# Patient Record
Sex: MALE | Race: WHITE | NOT HISPANIC OR LATINO | Employment: FULL TIME | URBAN - METROPOLITAN AREA
[De-identification: names, ages, dates, MRNs, and addresses within clinical notes are randomized per-mention and may not be internally consistent; named-entity substitution may affect disease eponyms.]

---

## 2019-01-04 ENCOUNTER — OFFICE VISIT (OUTPATIENT)
Dept: FAMILY MEDICINE CLINIC | Facility: CLINIC | Age: 39
End: 2019-01-04

## 2019-01-04 VITALS
HEART RATE: 97 BPM | OXYGEN SATURATION: 98 % | DIASTOLIC BLOOD PRESSURE: 78 MMHG | BODY MASS INDEX: 29.52 KG/M2 | SYSTOLIC BLOOD PRESSURE: 130 MMHG | TEMPERATURE: 98.1 F | HEIGHT: 74 IN | WEIGHT: 230 LBS | RESPIRATION RATE: 18 BRPM

## 2019-01-04 DIAGNOSIS — Z13.220 ENCOUNTER FOR LIPID SCREENING FOR CARDIOVASCULAR DISEASE: ICD-10-CM

## 2019-01-04 DIAGNOSIS — Z00.00 WELL ADULT EXAM: Primary | ICD-10-CM

## 2019-01-04 DIAGNOSIS — Z13.6 ENCOUNTER FOR LIPID SCREENING FOR CARDIOVASCULAR DISEASE: ICD-10-CM

## 2019-01-04 DIAGNOSIS — L98.9 SKIN LESION OF LEFT LEG: ICD-10-CM

## 2019-01-04 PROCEDURE — 99395 PREV VISIT EST AGE 18-39: CPT | Performed by: FAMILY MEDICINE

## 2019-01-04 NOTE — PROGRESS NOTES
Assessment/Plan       Problems Addressed this Visit     None      Visit Diagnoses     Well adult exam    -  Primary    Relevant Orders    Lipid Panel With / Chol / HDL Ratio    Comprehensive Metabolic Panel    Encounter for lipid screening for cardiovascular disease        Relevant Orders    Lipid Panel With / Chol / HDL Ratio    Comprehensive Metabolic Panel    Skin lesion of left leg                Follow up: Return if symptoms worsen or fail to improve, for follow up depends on review of labs and testing.     DISCUSSION  Well examination.  Continue routine health maintenance including regular dentistry, recommend eye exam, seatbelt use, exercise, diet changes and losing weight.    Check labs as noted.  He will follow-up and do this fasting.    Skin lesion of left leg.  Appears to be vascular.  May try some hydrocortisone to see if that will help the irritation but if not improving, may need to see dermatology.  Since it has not changed in the last 3-5 years, doubt that it is a malignancy and      MEDICATIONS PRESCRIBED  Requested Prescriptions      No prescriptions requested or ordered in this encounter            -------------------------------------------    Subjective     Chief Complaint   Patient presents with   • Annual Exam         History of Present Illness    The patient is a 38 y.o. male who comes in to the office today for well exam.      Nutrition:  Not really.   Exercise:  Not enough  Sleep:  sleep ok    + seat belt     Dentist: + regular  Eye Exam: no exam.     Stressors: Work stress, new position. Wife deployed to [place] [facility]. 4 children. ( 14,12,9 and 6)    Advanced Directives:  yes        FH : DM 2, GF + cancer, no FH colon cancer    Colonoscopy: not yet.     Other concerns/problems:    Spot on the left leg. A few years ago, appeared. 4-5 years  Looked liked a scar but no recall of injury  Discoloration not getting better  Over last 1-2 months, shedding skin now. Was in shower and peel off and then  "would bleed. (one month ago this occurred)  No pain   No increase in size over 4-5 years             Social History     Tobacco Use   Smoking Status Never Smoker   Smokeless Tobacco Never Used        Past Medical History,Medications, Allergies, and social history was reviewed.      Review of Systems   Constitutional: Negative.    HENT: Negative.    Respiratory: Negative.    Cardiovascular: Negative.         Occ sternum pain but has not happened in awhile. Hard to breath when occurs   Gastrointestinal: Negative.    Genitourinary: Negative.  Negative for difficulty urinating.   Musculoskeletal: Negative.  Negative for arthralgias and back pain.        Feet occ pain. Left. When exercises, \"bone\" out of place and then pain. Sporadic. Sharp pain. Comes and goes.    Skin: Positive for skin lesions.   Neurological: Negative.  Negative for dizziness and syncope.   Psychiatric/Behavioral: Negative.  Negative for sleep disturbance and depressed mood.        Wife states patel       Objective     Vitals:    01/04/19 1427   BP: 130/78   Pulse: 97   Resp: 18   Temp: 98.1 °F (36.7 °C)   TempSrc: Temporal   SpO2: 98%   Weight: 104 kg (230 lb)   Height: 188 cm (74\")          Physical Exam   Constitutional: Vital signs are normal. He appears well-developed and well-nourished.   HENT:   Head: Normocephalic and atraumatic.   Right Ear: Hearing, tympanic membrane, external ear and ear canal normal.   Left Ear: Hearing, tympanic membrane, external ear and ear canal normal.   Mouth/Throat: Oropharynx is clear and moist.   Eyes: Conjunctivae, EOM and lids are normal. Pupils are equal, round, and reactive to light.   Neck: Normal range of motion. Neck supple. No thyromegaly present.   Cardiovascular: Normal rate, regular rhythm and normal heart sounds. Exam reveals no friction rub.   No murmur heard.  Pulmonary/Chest: Effort normal and breath sounds normal. No respiratory distress. He has no wheezes. He has no rales.   Abdominal: Soft. " Normal appearance and bowel sounds are normal. He exhibits no distension and no mass. There is no tenderness. There is no rebound and no guarding.   Musculoskeletal: He exhibits no edema.   Neurological: He is alert. He has normal strength.   Skin: Skin is warm and dry.   Psychiatric: He has a normal mood and affect. His speech is normal. Cognition and memory are normal.   Nursing note and vitals reviewed.    Left shin: Lesion 1 cm, no bleeding, blanches. Non tender                Red Mckeon MD

## 2019-01-07 ENCOUNTER — RESULTS ENCOUNTER (OUTPATIENT)
Dept: FAMILY MEDICINE CLINIC | Facility: CLINIC | Age: 39
End: 2019-01-07

## 2019-01-07 DIAGNOSIS — Z13.220 ENCOUNTER FOR LIPID SCREENING FOR CARDIOVASCULAR DISEASE: ICD-10-CM

## 2019-01-07 DIAGNOSIS — Z13.6 ENCOUNTER FOR LIPID SCREENING FOR CARDIOVASCULAR DISEASE: ICD-10-CM

## 2019-01-07 DIAGNOSIS — Z00.00 WELL ADULT EXAM: ICD-10-CM

## 2019-11-04 ENCOUNTER — NURSE TRIAGE (OUTPATIENT)
Dept: CALL CENTER | Facility: HOSPITAL | Age: 39
End: 2019-11-04

## 2019-11-04 ENCOUNTER — TELEPHONE (OUTPATIENT)
Dept: FAMILY MEDICINE CLINIC | Facility: CLINIC | Age: 39
End: 2019-11-04

## 2019-11-04 NOTE — TELEPHONE ENCOUNTER
Call received through [facility] that patient was on the line requesting to make a dr. appt for palpitations.      Reason for Disposition  • Problems with anxiety or stress    Additional Information  • Negative: Passed out (i.e., lost consciousness, collapsed and was not responding)  • Negative: Shock suspected (e.g., cold/pale/clammy skin, too weak to stand, low BP, rapid pulse)  • Negative: Difficult to awaken or acting confused (e.g., disoriented, slurred speech)  • Negative: Visible sweat on face or sweat dripping down face  • Negative: Unable to walk, or can only walk with assistance (e.g., requires support)  • Negative: [1] Received SHOCK from implantable cardiac defibrillator AND [2] persisting symptoms (i.e., palpitations, lightheadedness)  • Negative: Sounds like a life-threatening emergency to the triager  • Negative: Chest pain  • Negative: Difficulty breathing  • Negative: Dizziness, lightheadedness, or weakness  • Negative: [1] Heart beating very rapidly (e.g., > 140 / minute) AND [2] present now  (Exception: during exercise)  • Negative: Heart beating very slowly (e.g., < 50 / minute)  (Exception: athlete)  • Negative: New or worsened shortness of breath with activity (dyspnea on exertion)  • Negative: Patient sounds very sick or weak to the triager  • Negative: [1] Heart beating very rapidly (e.g., > 140 / minute) AND [2] not present now  (Exception: during exercise)  • Negative: [1] Skipped or extra beat(s) AND [2] increases with exercise or exertion  • Negative: [1] Skipped or extra beat(s) AND [2] occurs 4 or more times per minute  • Negative: New or worsened ankle swelling  • Negative: History of heart disease  (i.e., heart attack, bypass surgery, angina, angioplasty, CHF) (Exception: brief heart beat symptoms that went away and now feels well)  • Negative: Age > 60 years (Exception: brief heart beat symptoms that went away and now feels well)  • Negative: Taking water pill (i.e., diuretic) or heart  "medication (e.g., digoxin)  • Negative: Wearing a \"holter monitor\" or \"cardiac event monitor\"  • Negative: [1] Received SHOCK from implantable cardiac defibrillator AND [2] now feels well  • Negative: History of hyperthyroidism or taking thyroid medication  • Negative: Known or suspected substance abuse (e.g., cocaine, alcohol abuse)  • Negative: [1] Palpitations AND [2] no improvement after following Care Advice    Answer Assessment - Initial Assessment Questions  1. DESCRIPTION: \"Please describe your heart rate or heart beat that you are having\" (e.g., fast/slow, regular/irregular, skipped or extra beats, \"palpitations\")      Palpitations  2. ONSET: \"When did it start?\" (Minutes, hours or days)       2-3 months ago  3. DURATION: \"How long does it last\" (e.g., seconds, minutes, hours)      minutes  4. PATTERN \"Does it come and go, or has it been constant since it started?\"  \"Does it get worse with exertion?\"   \"Are you feeling it now?\"      intermittent  5. TAP: \"Using your hand, can you tap out what you are feeling on a chair or table in front of you, so that I can hear?\" (Note: not all patients can do this)        Described as skipping a few beats at a time  6. HEART RATE: \"Can you tell me your heart rate?\" \"How many beats in 15 seconds?\"  (Note: not all patients can do this)        Not happening at this time  7. RECURRENT SYMPTOM: \"Have you ever had this before?\" If so, ask: \"When was the last time?\" and \"What happened that time?\"       Few days ago  8. CAUSE: \"What do you think is causing the palpitations?\"      Stress  9. CARDIAC HISTORY: \"Do you have any history of heart disease?\" (e.g., heart attack, angina, bypass surgery, angioplasty, arrhythmia)       No  10. OTHER SYMPTOMS: \"Do you have any other symptoms?\" (e.g., dizziness, chest pain, sweating, difficulty breathing)        No  11. PREGNANCY: \"Is there any chance you are pregnant?\" \"When was your last menstrual period?\"        N/A    Protocols used: " HEART RATE AND HEARTBEAT QUESTIONS-ADULT-AH

## 2019-11-04 NOTE — TELEPHONE ENCOUNTER
Attempted to contact patient. No answer, left voicemail message to return call with office number given.

## 2019-11-04 NOTE — TELEPHONE ENCOUNTER
FYI  PATIENT CALLED REQUESTING AN APPT WITH DR. ELDER. PATIENT STATED HE HAD CHEST PAIN, TRIAGED TO [facility] AND CONFERENCED IN WITH JANET.     SHE TRIAGED PATIENT - PLEASE SEE TRIAGE NOTE CREATED BY JANET ON 11/04/2019     OKAY TO MAKE AN APPT PER JANET.     APPT MADE FOR 11/05/2019 @3: WITH FIRST AVAILABLE PROVIDER- SABRINA BRIGGS.    THANK YOU.

## 2019-11-04 NOTE — TELEPHONE ENCOUNTER
PATIENT CALLED BACK AND WILL KEEP APPT FOR TOMORROW. HAD CONFLICT FOR TODAYS WORK IN APPT WITH DR ELDER. PATIENT STATED NO CHEST PAINS CURRENTLY     PT CALL BACK  [phone]

## 2019-11-05 ENCOUNTER — OFFICE VISIT (OUTPATIENT)
Dept: FAMILY MEDICINE CLINIC | Facility: CLINIC | Age: 39
End: 2019-11-05

## 2019-11-05 VITALS
HEART RATE: 57 BPM | SYSTOLIC BLOOD PRESSURE: 134 MMHG | BODY MASS INDEX: 27.08 KG/M2 | OXYGEN SATURATION: 98 % | WEIGHT: 211 LBS | RESPIRATION RATE: 16 BRPM | DIASTOLIC BLOOD PRESSURE: 82 MMHG | HEIGHT: 74 IN | TEMPERATURE: 97.5 F

## 2019-11-05 DIAGNOSIS — F43.9 STRESS AT HOME: ICD-10-CM

## 2019-11-05 DIAGNOSIS — R00.2 HEART PALPITATIONS: Primary | ICD-10-CM

## 2019-11-05 PROCEDURE — 99214 OFFICE O/P EST MOD 30 MIN: CPT | Performed by: NURSE PRACTITIONER

## 2019-11-05 NOTE — PROGRESS NOTES
Subjective   Alfredo Muñoz is a 39 y.o. male.     History of Present Illness   Random palpitations, not painful but gets his attention started a few months ago, recently more frequently one time a week  Lasts a few seconds not dizziness or SOA  Cannot associate it with any activity,   Just sitting at home he will have symptoms of heart fluttering  No hx of heart problems  Lot of personal stress wife returned from deployment  Light alcohol intake  Increase in intake of Coffee 2 cups per day or more now, used to only have 1  No tobacco use     The following portions of the patient's history were reviewed and updated as appropriate: allergies, current medications, past family history, past medical history, past social history, past surgical history and problem list.    Review of Systems   Constitutional: Negative.  Negative for chills, fatigue and fever.   HENT: Negative.    Eyes: Negative.    Respiratory: Negative.    Cardiovascular: Positive for chest pain and palpitations. Negative for leg swelling.   Gastrointestinal: Negative.  Negative for nausea and vomiting.   Musculoskeletal: Negative.    Skin: Negative.    Allergic/Immunologic: Negative.    Neurological: Negative.    Hematological: Negative.    Psychiatric/Behavioral: Negative.  Positive for stress.       Objective   Physical Exam   Constitutional: He is oriented to person, place, and time. He appears well-developed and well-nourished. No distress.   HENT:   Head: Normocephalic and atraumatic.   Right Ear: External ear normal.   Left Ear: External ear normal.   Nose: Nose normal.   Mouth/Throat: Oropharynx is clear and moist. No oropharyngeal exudate.   Neck: Neck supple. No JVD present. No thyromegaly present.   Cardiovascular: Normal rate, regular rhythm, normal heart sounds and intact distal pulses. Exam reveals no gallop and no friction rub.   No murmur heard.  Pulmonary/Chest: Effort normal and breath sounds normal. No stridor. No respiratory distress.  He has no wheezes. He has no rales.   Abdominal: Soft. Bowel sounds are normal.   Musculoskeletal: Normal range of motion. He exhibits no edema.   Lymphadenopathy:     He has no cervical adenopathy.   Neurological: He is alert and oriented to person, place, and time. He displays normal reflexes.   Skin: Skin is warm and dry. Capillary refill takes less than 2 seconds. He is not diaphoretic.   Psychiatric: He has a normal mood and affect. His behavior is normal. Judgment and thought content normal.   Nursing note and vitals reviewed.      ECG 12 Lead  Date/Time: 11/7/2019 10:17 AM  Performed by: Elizabeth Tong APRN  Authorized by: Elizabeth Tong APRN   Rhythm: sinus rhythm  Rate: bradycardic  BPM: 58  Conduction: conduction normal  ST Segments: ST segments normal  T Waves: T waves normal  QRS axis: normal  Other: no other findings    Clinical impression: normal ECG          Assessment/Plan   Alfredo was seen today for chest pain.    Diagnoses and all orders for this visit:    Heart palpitations  -     TSH  -     Comprehensive Metabolic Panel  -     CBC & Differential  -     Vitamin B12  -     SCANNED EKG  -     ECG 12 Lead    Stress at home  -     Comprehensive Metabolic Panel  -     Vitamin B12    normal EKG  Normal labs   If symptoms persist or worsen recommend Holter monitor and or ECHO  Pt agrees

## 2019-11-06 LAB
ALBUMIN SERPL-MCNC: 4.7 G/DL (ref 3.5–5.5)
ALBUMIN/GLOB SERPL: 2.2 {RATIO} (ref 1.2–2.2)
ALP SERPL-CCNC: 52 IU/L (ref 39–117)
ALT SERPL-CCNC: 19 IU/L (ref 0–44)
AST SERPL-CCNC: 18 IU/L (ref 0–40)
BASOPHILS # BLD AUTO: 0 X10E3/UL (ref 0–0.2)
BASOPHILS NFR BLD AUTO: 1 %
BILIRUB SERPL-MCNC: 0.5 MG/DL (ref 0–1.2)
BUN SERPL-MCNC: 14 MG/DL (ref 6–24)
BUN/CREAT SERPL: 14 (ref 9–20)
CALCIUM SERPL-MCNC: 9.5 MG/DL (ref 8.7–10.2)
CHLORIDE SERPL-SCNC: 101 MMOL/L (ref 96–106)
CO2 SERPL-SCNC: 24 MMOL/L (ref 20–29)
CREAT SERPL-MCNC: 0.98 MG/DL (ref 0.76–1.27)
EOSINOPHIL # BLD AUTO: 0.3 X10E3/UL (ref 0–0.4)
EOSINOPHIL NFR BLD AUTO: 4 %
ERYTHROCYTE [DISTWIDTH] IN BLOOD BY AUTOMATED COUNT: 14 % (ref 12.3–15.4)
GLOBULIN SER CALC-MCNC: 2.1 G/DL (ref 1.5–4.5)
GLUCOSE SERPL-MCNC: 94 MG/DL (ref 65–99)
HCT VFR BLD AUTO: 44.9 % (ref 37.5–51)
HGB BLD-MCNC: 15.5 G/DL (ref 13–17.7)
IMM GRANULOCYTES # BLD AUTO: 0 X10E3/UL (ref 0–0.1)
IMM GRANULOCYTES NFR BLD AUTO: 0 %
LYMPHOCYTES # BLD AUTO: 1.3 X10E3/UL (ref 0.7–3.1)
LYMPHOCYTES NFR BLD AUTO: 22 %
MCH RBC QN AUTO: 30.3 PG (ref 26.6–33)
MCHC RBC AUTO-ENTMCNC: 34.5 G/DL (ref 31.5–35.7)
MCV RBC AUTO: 88 FL (ref 79–97)
MONOCYTES # BLD AUTO: 0.5 X10E3/UL (ref 0.1–0.9)
MONOCYTES NFR BLD AUTO: 8 %
NEUTROPHILS # BLD AUTO: 4.1 X10E3/UL (ref 1.4–7)
NEUTROPHILS NFR BLD AUTO: 65 %
PLATELET # BLD AUTO: 213 X10E3/UL (ref 150–450)
POTASSIUM SERPL-SCNC: 4.2 MMOL/L (ref 3.5–5.2)
PROT SERPL-MCNC: 6.8 G/DL (ref 6–8.5)
RBC # BLD AUTO: 5.11 X10E6/UL (ref 4.14–5.8)
SODIUM SERPL-SCNC: 141 MMOL/L (ref 134–144)
TSH SERPL DL<=0.005 MIU/L-ACNC: 1.15 UIU/ML (ref 0.45–4.5)
VIT B12 SERPL-MCNC: 333 PG/ML (ref 232–1245)
WBC # BLD AUTO: 6.2 X10E3/UL (ref 3.4–10.8)

## 2019-11-07 PROCEDURE — 93000 ELECTROCARDIOGRAM COMPLETE: CPT | Performed by: NURSE PRACTITIONER

## 2020-02-10 ENCOUNTER — OFFICE VISIT (OUTPATIENT)
Dept: FAMILY MEDICINE CLINIC | Facility: CLINIC | Age: 40
End: 2020-02-10

## 2020-02-10 VITALS
HEIGHT: 74 IN | OXYGEN SATURATION: 98 % | SYSTOLIC BLOOD PRESSURE: 112 MMHG | BODY MASS INDEX: 28.75 KG/M2 | HEART RATE: 60 BPM | WEIGHT: 224 LBS | DIASTOLIC BLOOD PRESSURE: 74 MMHG | TEMPERATURE: 97.9 F

## 2020-02-10 DIAGNOSIS — M25.521 RIGHT ELBOW PAIN: Primary | ICD-10-CM

## 2020-02-10 PROCEDURE — 99213 OFFICE O/P EST LOW 20 MIN: CPT | Performed by: PHYSICIAN ASSISTANT

## 2020-02-10 RX ORDER — PREDNISONE 10 MG/1
TABLET ORAL
Qty: 21 EACH | Refills: 0 | Status: SHIPPED | OUTPATIENT
Start: 2020-02-10 | End: 2021-10-15

## 2020-02-10 NOTE — PROGRESS NOTES
Subjective   Alfredo Muñoz is a 39 y.o. male.     History of Present Illness   Pt presents with CC of R elbow pain. Felt along bony prominence of olecranon. Pain started on 1/12. No known injury. Did attend an event with son where they were doing climbing activities the day before. No known injury or trauma to elbow. No issues with this elbow in the past. First day he noticed pain, could not flex elbow. Had to hold straight.   Slowly getting better. ROM intact but still aching. Wants to make sure nothing else is wrong.   No swelling, redness, or warmth. No hx of gout.   R hand dominant.   Pt has not tried anything for symptoms.   No numbness or tingling of fingers. No forearm tenderness.     The following portions of the patient's history were reviewed and updated as appropriate: allergies, current medications, past family history, past medical history, past social history, past surgical history and problem list.    Review of Systems   Constitutional: Negative.  Negative for chills, diaphoresis, fatigue and fever.   HENT: Negative.  Negative for congestion, ear discharge, ear pain, hearing loss, nosebleeds, postnasal drip, sinus pressure, sneezing and sore throat.    Eyes: Negative.    Respiratory: Negative.  Negative for cough, chest tightness, shortness of breath and wheezing.    Cardiovascular: Negative.  Negative for chest pain, palpitations and leg swelling.   Gastrointestinal: Negative for abdominal distention, abdominal pain, blood in stool, constipation, diarrhea, nausea and vomiting.   Genitourinary: Negative.  Negative for difficulty urinating, dysuria, flank pain, frequency, hematuria and urgency.   Musculoskeletal: Positive for arthralgias. Negative for back pain, gait problem, joint swelling, myalgias, neck pain and neck stiffness.   Skin: Negative.  Negative for color change, pallor, rash and wound.   Neurological: Negative for dizziness, syncope, weakness, light-headedness, numbness and headaches.  "      Objective    Blood pressure 112/74, pulse 60, temperature 97.9 °F (36.6 °C), height 188 cm (74\"), weight 102 kg (224 lb), SpO2 98 %.     Physical Exam   Constitutional: He is oriented to person, place, and time. He appears well-developed and well-nourished.   HENT:   Head: Normocephalic and atraumatic.   Right Ear: External ear normal.   Left Ear: External ear normal.   Nose: Nose normal.   Mouth/Throat: Oropharynx is clear and moist. No oropharyngeal exudate.   Eyes: Conjunctivae are normal.   Neck: Normal range of motion. Neck supple. No tracheal deviation present. No thyromegaly present.   Cardiovascular: Normal rate, regular rhythm, normal heart sounds and intact distal pulses. Exam reveals no gallop and no friction rub.   No murmur heard.  Pulmonary/Chest: Effort normal and breath sounds normal. No respiratory distress. He has no wheezes. He has no rales. He exhibits no tenderness.   Abdominal: Soft. Bowel sounds are normal. He exhibits no distension and no mass. There is no tenderness. There is no rebound and no guarding. No hernia.   Musculoskeletal: Normal range of motion. He exhibits no edema or deformity.        Right elbow: He exhibits normal range of motion, no swelling, no effusion, no deformity and no laceration. Tenderness found. Olecranon process tenderness noted. No radial head, no medial epicondyle and no lateral epicondyle tenderness noted.   Lymphadenopathy:     He has no cervical adenopathy.   Neurological: He is alert and oriented to person, place, and time.   Skin: Skin is warm and dry.   Psychiatric: He has a normal mood and affect. His behavior is normal. Judgment and thought content normal.   Nursing note and vitals reviewed.      Assessment/Plan   Alfredo was seen today for elbow pain.    Diagnoses and all orders for this visit:    Right elbow pain  -     predniSONE (DELTASONE) 10 MG (21) tablet pack; Use as directed on package  -     XR Elbow 2 View Right    trial of steroid for " inflammation. If no improvement, proceed with XR of elbow. Pt agrees.

## 2020-03-04 ENCOUNTER — HOSPITAL ENCOUNTER (OUTPATIENT)
Dept: GENERAL RADIOLOGY | Facility: HOSPITAL | Age: 40
Discharge: HOME | End: 2020-03-04
Admitting: PHYSICIAN ASSISTANT

## 2020-03-04 PROCEDURE — 73070 X-RAY EXAM OF ELBOW: CPT

## 2020-03-09 DIAGNOSIS — M25.521 RIGHT ELBOW PAIN: Primary | ICD-10-CM

## 2020-03-16 ENCOUNTER — TELEPHONE (OUTPATIENT)
Dept: FAMILY MEDICINE CLINIC | Facility: CLINIC | Age: 40
End: 2020-03-16

## 2021-10-15 ENCOUNTER — HOSPITAL ENCOUNTER (OUTPATIENT)
Dept: GENERAL RADIOLOGY | Facility: HOSPITAL | Age: 41
Discharge: HOME | End: 2021-10-15
Admitting: NURSE PRACTITIONER

## 2021-10-15 ENCOUNTER — OFFICE VISIT (OUTPATIENT)
Dept: FAMILY MEDICINE CLINIC | Facility: CLINIC | Age: 41
End: 2021-10-15

## 2021-10-15 VITALS
TEMPERATURE: 98.2 F | DIASTOLIC BLOOD PRESSURE: 80 MMHG | SYSTOLIC BLOOD PRESSURE: 130 MMHG | RESPIRATION RATE: 20 BRPM | OXYGEN SATURATION: 96 % | HEART RATE: 76 BPM | WEIGHT: 218.6 LBS | HEIGHT: 74 IN | BODY MASS INDEX: 28.06 KG/M2

## 2021-10-15 DIAGNOSIS — R07.81 RIB PAIN ON LEFT SIDE: ICD-10-CM

## 2021-10-15 DIAGNOSIS — M25.532 ACUTE WRIST PAIN, LEFT: Primary | ICD-10-CM

## 2021-10-15 DIAGNOSIS — S62.102A CLOSED FRACTURE OF LEFT WRIST, INITIAL ENCOUNTER: Primary | ICD-10-CM

## 2021-10-15 DIAGNOSIS — M25.532 ACUTE WRIST PAIN, LEFT: ICD-10-CM

## 2021-10-15 PROCEDURE — 71101 X-RAY EXAM UNILAT RIBS/CHEST: CPT

## 2021-10-15 PROCEDURE — 73110 X-RAY EXAM OF WRIST: CPT

## 2021-10-15 PROCEDURE — 99214 OFFICE O/P EST MOD 30 MIN: CPT | Performed by: NURSE PRACTITIONER

## 2021-10-15 RX ORDER — CEPHALEXIN 500 MG/1
500 CAPSULE ORAL 2 TIMES DAILY
COMMUNITY
Start: 2021-10-12 | End: 2022-09-19
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
## 2022-09-19 ENCOUNTER — OFFICE VISIT (OUTPATIENT)
Dept: FAMILY MEDICINE CLINIC | Facility: CLINIC | Age: 42
End: 2022-09-19

## 2022-09-19 VITALS
HEIGHT: 74 IN | OXYGEN SATURATION: 97 % | TEMPERATURE: 98.2 F | DIASTOLIC BLOOD PRESSURE: 65 MMHG | HEART RATE: 80 BPM | WEIGHT: 219 LBS | BODY MASS INDEX: 28.11 KG/M2 | SYSTOLIC BLOOD PRESSURE: 128 MMHG | RESPIRATION RATE: 16 BRPM

## 2022-09-19 DIAGNOSIS — Z13.6 ENCOUNTER FOR LIPID SCREENING FOR CARDIOVASCULAR DISEASE: ICD-10-CM

## 2022-09-19 DIAGNOSIS — Z82.0 FH: SLEEP APNEA: ICD-10-CM

## 2022-09-19 DIAGNOSIS — Z13.220 ENCOUNTER FOR LIPID SCREENING FOR CARDIOVASCULAR DISEASE: ICD-10-CM

## 2022-09-19 DIAGNOSIS — R40.0 HAS DAYTIME DROWSINESS: ICD-10-CM

## 2022-09-19 DIAGNOSIS — Z00.00 WELL ADULT EXAM: Primary | ICD-10-CM

## 2022-09-19 DIAGNOSIS — R06.83 SNORING: ICD-10-CM

## 2022-09-19 PROCEDURE — 99396 PREV VISIT EST AGE 40-64: CPT | Performed by: FAMILY MEDICINE

## 2022-09-19 NOTE — PROGRESS NOTES
"Chief Complaint  Annual Exam    Subjective          Alfredo Muñoz presents to [facility]  History of Present Illness    Sleep Apnea  The patient reports that friends have told him that he snores heavily. He reports that he is tired during the day. He reports that he does not take naps during the day, but he does drink more caffeine. He denies headaches in the morning. The patient notes that he experiences fatigue when he is driving.    Diabetes Mellitus type 2  The patient reports a family history of diabetes on both paternal and maternal sides.    Health Maintenance  The patient sees a dentist regularly. He has not had an eye exam within the last year. The patient does wear his seatbelt. He notes that he has been exercising and eating healthy. The patient denies having the COVID-19 vaccines and does not plan to receive them. He denies being sick with a cough or cold. He denies chest pain, stomach problems, trouble urinating or erection issues. The patient denies any dizziness, syncope or depression.    COVID-19  The patient reports that he acquired COVID-19 in 2021.    Review of Systems   Constitutional: Positive for fatigue.   HENT: Negative.    Eyes: Positive for visual disturbance (close vision).   Respiratory: Negative.    Cardiovascular: Negative.    Gastrointestinal: Negative.    Genitourinary: Negative.  Negative for difficulty urinating and erectile dysfunction.   Musculoskeletal: Negative.    Neurological: Negative.  Negative for dizziness and syncope.   Psychiatric/Behavioral: Negative.         Objective       Vital Signs:   /65   Pulse 80   Temp 98.2 °F (36.8 °C)   Resp 16   Ht 188 cm (74\")   Wt 99.3 kg (219 lb)   SpO2 97%   BMI 28.12 kg/m²     Physical Exam  Vitals and nursing note reviewed.   Constitutional:       General: He is not in acute distress.     Appearance: Normal appearance. He is well-developed. He is not ill-appearing.   HENT:      Head: " Normocephalic and atraumatic.      Right Ear: Hearing, tympanic membrane, ear canal and external ear normal.      Left Ear: Hearing, tympanic membrane, ear canal and external ear normal.      Nose: Nose normal. No congestion or rhinorrhea.      Mouth/Throat:      Mouth: Mucous membranes are moist.      Pharynx: No oropharyngeal exudate or posterior oropharyngeal erythema.   Eyes:      General: Lids are normal.      Conjunctiva/sclera: Conjunctivae normal.      Pupils: Pupils are equal, round, and reactive to light.   Neck:      Thyroid: No thyromegaly.   Cardiovascular:      Rate and Rhythm: Normal rate and regular rhythm.      Heart sounds: Normal heart sounds. No murmur heard.    No friction rub.   Pulmonary:      Effort: Pulmonary effort is normal. No respiratory distress.      Breath sounds: Normal breath sounds. No wheezing or rales.   Abdominal:      General: Bowel sounds are normal. There is no distension.      Palpations: Abdomen is soft. There is no mass.      Tenderness: There is no abdominal tenderness. There is no guarding or rebound.   Musculoskeletal:      Right lower leg: No edema.      Left lower leg: No edema.   Skin:     General: Skin is warm and dry.   Neurological:      General: No focal deficit present.      Mental Status: He is alert.   Psychiatric:         Mood and Affect: Mood normal.         Speech: Speech normal.         Behavior: Behavior normal.          Result Review :                     Assessment and Plan    Diagnoses and all orders for this visit:    1. Well adult exam (Primary)  -     CBC & Differential  -     Comprehensive Metabolic Panel  -     Lipid Panel With / Chol / HDL Ratio    2. Snoring  -     Home Sleep Study; Future    3. Has daytime drowsiness  -     Home Sleep Study; Future    4. FH: sleep apnea    5. Encounter for lipid screening for cardiovascular disease  -     Lipid Panel With / Chol / HDL Ratio          DISCUSSION    Here for well examination.  Continue routine  health maintenance including routine dentistry, routine eye exams, safety, seatbelt use, exercise and proper nutrition.    He has snoring with daytime drowsiness and family history of sleep apnea.  Set up a home sleep study.  Suspect that he may have obstructive sleep apnea.  He also has a family history of sleep apnea.    Lipid screening.  He will follow-up for blood work including CBC, CMP and lipid panel.    Discussed future testing including colonoscopy versus Cologuard at age 44.  He does not wish to have COVID vaccination at this time.  He reports tetanus vaccination is up-to-date.    Follow Up   Return for follow up depends on review of labs and testing.    Patient was given instructions and counseling regarding his condition or for health maintenance advice. Please see specific information pulled into the AVS if appropriate.       Red Mckeon MD     Transcribed from ambient dictation for Red Mckeon MD by LEXY VILLAGOMEZ.  09/19/22   11:48 EDT    Patient verbalized consent to the visit recording.  I have personally performed the services described in this document as transcribed by the above individual, and it is both accurate and complete.  Red Mckeon MD  9/19/2022  18:31 EDT

## 2022-10-17 ENCOUNTER — APPOINTMENT (OUTPATIENT)
Dept: SLEEP MEDICINE | Facility: HOSPITAL | Age: 42
End: 2022-10-17

## 2022-10-31 ENCOUNTER — HOSPITAL ENCOUNTER (OUTPATIENT)
Dept: SLEEP MEDICINE | Facility: HOSPITAL | Age: 42
Discharge: HOME | End: 2022-10-31
Admitting: FAMILY MEDICINE

## 2022-10-31 VITALS — HEIGHT: 74 IN | BODY MASS INDEX: 28.11 KG/M2 | WEIGHT: 219 LBS

## 2022-10-31 DIAGNOSIS — R06.83 SNORING: ICD-10-CM

## 2022-10-31 DIAGNOSIS — R40.0 HAS DAYTIME DROWSINESS: ICD-10-CM

## 2022-10-31 PROCEDURE — 95800 SLP STDY UNATTENDED: CPT | Performed by: INTERNAL MEDICINE

## 2022-10-31 PROCEDURE — 95800 SLP STDY UNATTENDED: CPT

## 2022-11-01 LAB
ALBUMIN SERPL-MCNC: 4.8 G/DL (ref 3.5–5.2)
ALBUMIN/GLOB SERPL: 2.7 G/DL
ALP SERPL-CCNC: 55 U/L (ref 39–117)
ALT SERPL-CCNC: 16 U/L (ref 1–41)
AST SERPL-CCNC: 17 U/L (ref 1–40)
BASOPHILS # BLD AUTO: 0.06 10*3/MM3 (ref 0–0.2)
BASOPHILS NFR BLD AUTO: 0.9 % (ref 0–1.5)
BILIRUB SERPL-MCNC: 0.6 MG/DL (ref 0–1.2)
BUN SERPL-MCNC: 14 MG/DL (ref 6–20)
BUN/CREAT SERPL: 12.8 (ref 7–25)
CALCIUM SERPL-MCNC: 9.5 MG/DL (ref 8.6–10.5)
CHLORIDE SERPL-SCNC: 103 MMOL/L (ref 98–107)
CHOLEST SERPL-MCNC: 227 MG/DL (ref 0–200)
CHOLEST/HDLC SERPL: 5.04 {RATIO}
CO2 SERPL-SCNC: 26 MMOL/L (ref 22–29)
CREAT SERPL-MCNC: 1.09 MG/DL (ref 0.76–1.27)
EGFRCR SERPLBLD CKD-EPI 2021: 86.4 ML/MIN/1.73
EOSINOPHIL # BLD AUTO: 0.13 10*3/MM3 (ref 0–0.4)
EOSINOPHIL NFR BLD AUTO: 2 % (ref 0.3–6.2)
ERYTHROCYTE [DISTWIDTH] IN BLOOD BY AUTOMATED COUNT: 13 % (ref 12.3–15.4)
GLOBULIN SER CALC-MCNC: 1.8 GM/DL
GLUCOSE SERPL-MCNC: 82 MG/DL (ref 65–99)
HCT VFR BLD AUTO: 45.7 % (ref 37.5–51)
HDLC SERPL-MCNC: 45 MG/DL (ref 40–60)
HGB BLD-MCNC: 15.6 G/DL (ref 13–17.7)
IMM GRANULOCYTES # BLD AUTO: 0.05 10*3/MM3 (ref 0–0.05)
IMM GRANULOCYTES NFR BLD AUTO: 0.8 % (ref 0–0.5)
LDLC SERPL CALC-MCNC: 139 MG/DL (ref 0–100)
LYMPHOCYTES # BLD AUTO: 0.92 10*3/MM3 (ref 0.7–3.1)
LYMPHOCYTES NFR BLD AUTO: 13.9 % (ref 19.6–45.3)
MCH RBC QN AUTO: 30.2 PG (ref 26.6–33)
MCHC RBC AUTO-ENTMCNC: 34.1 G/DL (ref 31.5–35.7)
MCV RBC AUTO: 88.6 FL (ref 79–97)
MONOCYTES # BLD AUTO: 0.56 10*3/MM3 (ref 0.1–0.9)
MONOCYTES NFR BLD AUTO: 8.4 % (ref 5–12)
NEUTROPHILS # BLD AUTO: 4.92 10*3/MM3 (ref 1.7–7)
NEUTROPHILS NFR BLD AUTO: 74 % (ref 42.7–76)
NRBC BLD AUTO-RTO: 0.2 /100 WBC (ref 0–0.2)
PLATELET # BLD AUTO: 195 10*3/MM3 (ref 140–450)
POTASSIUM SERPL-SCNC: 4.2 MMOL/L (ref 3.5–5.2)
PROT SERPL-MCNC: 6.6 G/DL (ref 6–8.5)
RBC # BLD AUTO: 5.16 10*6/MM3 (ref 4.14–5.8)
SODIUM SERPL-SCNC: 142 MMOL/L (ref 136–145)
TRIGL SERPL-MCNC: 239 MG/DL (ref 0–150)
VLDLC SERPL CALC-MCNC: 43 MG/DL (ref 5–40)
WBC # BLD AUTO: 6.64 10*3/MM3 (ref 3.4–10.8)

## 2022-11-02 DIAGNOSIS — E78.2 MIXED HYPERLIPIDEMIA: Primary | ICD-10-CM

## 2024-03-08 ENCOUNTER — OFFICE VISIT (OUTPATIENT)
Dept: FAMILY MEDICINE CLINIC | Facility: CLINIC | Age: 44
End: 2024-03-08
Payer: COMMERCIAL

## 2024-03-08 VITALS
HEIGHT: 74 IN | SYSTOLIC BLOOD PRESSURE: 120 MMHG | HEART RATE: 51 BPM | WEIGHT: 217 LBS | TEMPERATURE: 97.1 F | DIASTOLIC BLOOD PRESSURE: 68 MMHG | OXYGEN SATURATION: 97 % | BODY MASS INDEX: 27.85 KG/M2 | RESPIRATION RATE: 14 BRPM

## 2024-03-08 DIAGNOSIS — B37.2 CANDIDIASIS OF SKIN: Primary | ICD-10-CM

## 2024-03-08 PROCEDURE — 99213 OFFICE O/P EST LOW 20 MIN: CPT | Performed by: NURSE PRACTITIONER

## 2024-03-08 RX ORDER — FLUCONAZOLE 150 MG/1
TABLET ORAL
Qty: 2 TABLET | Refills: 0 | Status: SHIPPED | OUTPATIENT
Start: 2024-03-08

## 2024-03-08 RX ORDER — CLOTRIMAZOLE AND BETAMETHASONE DIPROPIONATE 10; .64 MG/G; MG/G
1 CREAM TOPICAL 2 TIMES DAILY
Qty: 45 G | Refills: 2 | Status: SHIPPED | OUTPATIENT
Start: 2024-03-08

## 2024-12-13 ENCOUNTER — OFFICE VISIT (OUTPATIENT)
Dept: FAMILY MEDICINE CLINIC | Facility: CLINIC | Age: 44
End: 2024-12-13
Payer: COMMERCIAL

## 2024-12-13 VITALS
HEART RATE: 61 BPM | WEIGHT: 215.6 LBS | OXYGEN SATURATION: 97 % | DIASTOLIC BLOOD PRESSURE: 84 MMHG | SYSTOLIC BLOOD PRESSURE: 120 MMHG | RESPIRATION RATE: 14 BRPM | HEIGHT: 74 IN | TEMPERATURE: 97.1 F | BODY MASS INDEX: 27.67 KG/M2

## 2024-12-13 DIAGNOSIS — Z00.00 WELL ADULT EXAM: Primary | ICD-10-CM

## 2024-12-13 DIAGNOSIS — Z13.6 ENCOUNTER FOR LIPID SCREENING FOR CARDIOVASCULAR DISEASE: ICD-10-CM

## 2024-12-13 DIAGNOSIS — Z12.11 COLON CANCER SCREENING: ICD-10-CM

## 2024-12-13 DIAGNOSIS — Z13.220 ENCOUNTER FOR LIPID SCREENING FOR CARDIOVASCULAR DISEASE: ICD-10-CM

## 2024-12-13 PROCEDURE — 99396 PREV VISIT EST AGE 40-64: CPT | Performed by: FAMILY MEDICINE

## 2024-12-13 NOTE — PROGRESS NOTES
Chief Complaint  Annual Exam    Subjective          Alfredo Muñoz presents to [facility]    HPI         The patient is a 44-year-old male who is here for a well examination.    He reports no current health issues. 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 Since reducing his caffeine consumption, he has not had any further episodes. He maintains an active lifestyle, with increased physical activity over the past year, including running in preparation for an upcoming marathon in April. He reports no exercise-induced complications. His diet is generally healthy, avoiding fried and processed foods. He has consumed food today, including blueberries, lunch meat, and tomato soup. He has not received influenza or COVID-19 vaccines and is uncertain about his tetanus vaccination status. He has never had a blood transfusion, used intravenous drugs, or had multiple sexual partners. His energy levels are satisfactory. He has not had any recent illnesses such as colds or coughs. His vision is beginning to decline, but he does not have an ophthalmologist. He regularly sees a dentist. He reports no gastrointestinal issues, urinary difficulties, or skin concerns. He experienced a yeast infection in his axilla, which has since resolved. He experiences headaches when he reduces his caffeine intake. He reports no mood disturbances, depression, anxiety, or hearing problems. His parents are alive, with his father and two sisters having diabetes. His mother is in good health.    FAMILY HISTORY  - Grandfather on mother's side had cancer  - Father has diabetes  - Two sisters have diabetes    IMMUNIZATIONS  - Due for influenza vaccine  - Due for COVID-19 vaccine  - Due for tetanus vaccine       OTHER NOTES:          Review of Systems   Constitutional: Negative.  Negative for fatigue.   HENT: Negative.   "Negative for congestion and hearing loss.    Eyes: Negative.    Respiratory:  Negative for cough and shortness of breath.    Cardiovascular: Negative.    Gastrointestinal: Negative.  Negative for blood in stool.   Genitourinary: Negative.  Negative for difficulty urinating.   Musculoskeletal: Negative.    Skin: Negative.    Neurological:  Negative for dizziness, syncope and headache.   Psychiatric/Behavioral: Negative.  Negative for depressed mood. The patient is not nervous/anxious.         Objective       Vital Signs:   /84   Pulse 61   Temp 97.1 °F (36.2 °C)   Resp 14   Ht 188 cm (74.02\")   Wt 97.8 kg (215 lb 9.6 oz)   SpO2 97%   BMI 27.67 kg/m²     Physical Exam  Vitals and nursing note reviewed.   Constitutional:       General: He is not in acute distress.     Appearance: Normal appearance. He is well-developed. He is not ill-appearing.   HENT:      Head: Normocephalic and atraumatic.      Right Ear: Hearing, tympanic membrane, ear canal and external ear normal.      Left Ear: Hearing, tympanic membrane, ear canal and external ear normal.      Nose: Nose normal. No congestion or rhinorrhea.      Mouth/Throat:      Mouth: Mucous membranes are moist.      Pharynx: No oropharyngeal exudate or posterior oropharyngeal erythema.   Eyes:      General: Lids are normal.      Conjunctiva/sclera: Conjunctivae normal.      Pupils: Pupils are equal, round, and reactive to light.   Neck:      Thyroid: No thyromegaly.   Cardiovascular:      Rate and Rhythm: Normal rate and regular rhythm.      Heart sounds: Normal heart sounds. No murmur heard.     No friction rub.   Pulmonary:      Effort: Pulmonary effort is normal. No respiratory distress.      Breath sounds: Normal breath sounds. No wheezing or rales.   Abdominal:      General: Bowel sounds are normal. There is no distension.      Palpations: Abdomen is soft. There is no mass.      Tenderness: There is no abdominal tenderness. There is no guarding or rebound. "   Musculoskeletal:      Right lower leg: No edema.      Left lower leg: No edema.   Skin:     General: Skin is warm and dry.   Neurological:      Mental Status: He is alert.   Psychiatric:         Mood and Affect: Mood normal.         Speech: Speech normal.         Behavior: Behavior normal.             Lungs were auscultated.    Vital Signs  Blood pressure is 120/84. Body mass index is 27.7.       Result Review :            Other Results    Results               Assessment and Plan    Diagnoses and all orders for this visit:    1. Well adult exam (Primary)  -     CBC & Differential  -     Lipid Panel With / Chol / HDL Ratio  -     Comprehensive Metabolic Panel    2. Encounter for lipid screening for cardiovascular disease  -     Lipid Panel With / Chol / HDL Ratio  -     Comprehensive Metabolic Panel    3. Colon cancer screening  -     Ambulatory Referral For Screening Colonoscopy               DISCUSSION    Here for well examination.  Continue routine health maintenance including routine dentistry, eye exam, safety, seatbelt use, exercise and proper nutrition.     Health maintenance.  His last blood work was conducted 2 years ago, revealing borderline cholesterol levels. His blood pressure is within the normal range at 120/84, and his body mass index (BMI) is slightly elevated at 27.7. He is not yet eligible for prostate screening due to his age of 44. However, he is now due for colon cancer screening. A comprehensive blood panel will be ordered, including tests for blood count, liver function, kidney function, blood glucose, and cholesterol. A colonoscopy will be scheduled in [place]. He is advised to receive the influenza, COVID-19, and tetanus vaccines but he does not wish to do so.              Follow Up   Return for follow up depends on review of labs and testing.    Patient was given instructions and counseling regarding his condition or for health maintenance advice. Please see specific information pulled  into the AVS if appropriate.       Red Mckeon MD    Patient or patient representative verbalized consent for the use of Ambient Listening during the visit with  Red Mckeon MD for chart documentation. 12/13/2024  14:37 EST

## 2024-12-16 ENCOUNTER — LAB (OUTPATIENT)
Dept: FAMILY MEDICINE CLINIC | Facility: CLINIC | Age: 44
End: 2024-12-16
Payer: COMMERCIAL

## 2024-12-16 LAB
ALBUMIN SERPL-MCNC: 4.3 G/DL (ref 3.5–5.2)
ALBUMIN/GLOB SERPL: 1.8 G/DL
ALP SERPL-CCNC: 56 U/L (ref 39–117)
ALT SERPL-CCNC: 25 U/L (ref 1–41)
AST SERPL-CCNC: 22 U/L (ref 1–40)
BASOPHILS # BLD AUTO: 0.05 10*3/MM3 (ref 0–0.2)
BASOPHILS NFR BLD AUTO: 0.9 % (ref 0–1.5)
BILIRUB SERPL-MCNC: 0.5 MG/DL (ref 0–1.2)
BUN SERPL-MCNC: 16 MG/DL (ref 6–20)
BUN/CREAT SERPL: 16.2 (ref 7–25)
CALCIUM SERPL-MCNC: 9.5 MG/DL (ref 8.6–10.5)
CHLORIDE SERPL-SCNC: 105 MMOL/L (ref 98–107)
CHOLEST SERPL-MCNC: 273 MG/DL (ref 0–200)
CHOLEST/HDLC SERPL: 5.57 {RATIO}
CO2 SERPL-SCNC: 27.4 MMOL/L (ref 22–29)
CREAT SERPL-MCNC: 0.99 MG/DL (ref 0.76–1.27)
EGFRCR SERPLBLD CKD-EPI 2021: 95.7 ML/MIN/1.73
EOSINOPHIL # BLD AUTO: 0.24 10*3/MM3 (ref 0–0.4)
EOSINOPHIL NFR BLD AUTO: 4.5 % (ref 0.3–6.2)
ERYTHROCYTE [DISTWIDTH] IN BLOOD BY AUTOMATED COUNT: 12.7 % (ref 12.3–15.4)
GLOBULIN SER CALC-MCNC: 2.4 GM/DL
GLUCOSE SERPL-MCNC: 88 MG/DL (ref 65–99)
HCT VFR BLD AUTO: 45 % (ref 37.5–51)
HDLC SERPL-MCNC: 49 MG/DL (ref 40–60)
HGB BLD-MCNC: 15.2 G/DL (ref 13–17.7)
IMM GRANULOCYTES # BLD AUTO: 0.02 10*3/MM3 (ref 0–0.05)
IMM GRANULOCYTES NFR BLD AUTO: 0.4 % (ref 0–0.5)
LDLC SERPL CALC-MCNC: 187 MG/DL (ref 0–100)
LYMPHOCYTES # BLD AUTO: 1.2 10*3/MM3 (ref 0.7–3.1)
LYMPHOCYTES NFR BLD AUTO: 22.3 % (ref 19.6–45.3)
MCH RBC QN AUTO: 30.3 PG (ref 26.6–33)
MCHC RBC AUTO-ENTMCNC: 33.8 G/DL (ref 31.5–35.7)
MCV RBC AUTO: 89.8 FL (ref 79–97)
MONOCYTES # BLD AUTO: 0.5 10*3/MM3 (ref 0.1–0.9)
MONOCYTES NFR BLD AUTO: 9.3 % (ref 5–12)
NEUTROPHILS # BLD AUTO: 3.36 10*3/MM3 (ref 1.7–7)
NEUTROPHILS NFR BLD AUTO: 62.6 % (ref 42.7–76)
NRBC BLD AUTO-RTO: 0 /100 WBC (ref 0–0.2)
PLATELET # BLD AUTO: 177 10*3/MM3 (ref 140–450)
POTASSIUM SERPL-SCNC: 4.8 MMOL/L (ref 3.5–5.2)
PROT SERPL-MCNC: 6.7 G/DL (ref 6–8.5)
RBC # BLD AUTO: 5.01 10*6/MM3 (ref 4.14–5.8)
SODIUM SERPL-SCNC: 142 MMOL/L (ref 136–145)
TRIGL SERPL-MCNC: 194 MG/DL (ref 0–150)
VLDLC SERPL CALC-MCNC: 37 MG/DL (ref 5–40)
WBC # BLD AUTO: 5.37 10*3/MM3 (ref 3.4–10.8)

## 2024-12-19 DIAGNOSIS — E78.2 MIXED HYPERLIPIDEMIA: Primary | ICD-10-CM

## 2025-03-12 ENCOUNTER — HOSPITAL ENCOUNTER (OUTPATIENT)
Dept: GENERAL RADIOLOGY | Facility: HOSPITAL | Age: 45
Discharge: HOME | End: 2025-03-12
Admitting: NURSE PRACTITIONER
Payer: COMMERCIAL

## 2025-03-12 ENCOUNTER — OFFICE VISIT (OUTPATIENT)
Dept: FAMILY MEDICINE CLINIC | Facility: CLINIC | Age: 45
End: 2025-03-12
Payer: COMMERCIAL

## 2025-03-12 VITALS
WEIGHT: 214 LBS | DIASTOLIC BLOOD PRESSURE: 70 MMHG | RESPIRATION RATE: 14 BRPM | HEART RATE: 53 BPM | HEIGHT: 74 IN | OXYGEN SATURATION: 97 % | TEMPERATURE: 97.8 F | SYSTOLIC BLOOD PRESSURE: 112 MMHG | BODY MASS INDEX: 27.46 KG/M2

## 2025-03-12 DIAGNOSIS — R00.1 SINUS BRADYCARDIA: ICD-10-CM

## 2025-03-12 DIAGNOSIS — R07.9 CHEST PAIN, UNSPECIFIED TYPE: ICD-10-CM

## 2025-03-12 DIAGNOSIS — R07.9 CHEST PAIN, UNSPECIFIED TYPE: Primary | ICD-10-CM

## 2025-03-12 DIAGNOSIS — R51.9 GENERALIZED HEADACHES: ICD-10-CM

## 2025-03-12 DIAGNOSIS — R09.1 PLEURISY: ICD-10-CM

## 2025-03-12 PROCEDURE — 71046 X-RAY EXAM CHEST 2 VIEWS: CPT

## 2025-03-12 PROCEDURE — 93000 ELECTROCARDIOGRAM COMPLETE: CPT | Performed by: NURSE PRACTITIONER

## 2025-03-12 RX ORDER — PREDNISONE 10 MG/1
TABLET ORAL
Qty: 21 TABLET | Refills: 0 | Status: SHIPPED | OUTPATIENT
Start: 2025-03-12

## 2025-03-12 NOTE — PROGRESS NOTES
"  Date: 2025   Patient Name: Alfredo Muñoz  : 1980   MRN: [id number]     Chief Complaint:    Chief Complaint   Patient presents with    Chest Pain     Last 3 to 4 days with mild headaches in the past week. No cardiac history.       History of Present Illness: Alfredo Muñoz is a 44 y.o. male who is here today to follow up for Chest tightness started on the right side that then radiated to the left side   Started about 3-4 days ago   Mild headaches with symptoms, visual floaters  He has been training, running 10-15 miles   Had the flu about 2 weeks ago, never tested but son was positive for flu   No other associated symptoms.     Chest Pain        Review of Systems:   Review of Systems   Cardiovascular:  Positive for chest pain.       I have reviewed the patients family history, social history, past medical history, past surgical history and have updated it as appropriate.     Medications:     Current Outpatient Medications:     predniSONE (DELTASONE) 10 MG (21) dose pack, Use as directed on package, Disp: 21 tablet, Rfl: 0    Allergies:   No Known Allergies    PHQ-9 Total Score:      Physical Exam:  Vital Signs:   Vitals:    25 1202   BP: 112/70   Pulse: 53   Resp: 14   Temp: 97.8 °F (36.6 °C)   SpO2: 97%   Weight: 97.1 kg (214 lb)   Height: 188 cm (74.02\")     Body mass index is 27.46 kg/m².   BMI is >= 25 and <30. (Overweight) The following options were offered after discussion;: weight loss educational material (shared in after visit summary)       Physical Exam  Vitals and nursing note reviewed.   Constitutional:       Appearance: Normal appearance.   HENT:      Head: Normocephalic and atraumatic.   Cardiovascular:      Rate and Rhythm: Normal rate and regular rhythm.   Pulmonary:      Effort: Pulmonary effort is normal.      Breath sounds: Normal breath sounds.   Chest:      Chest wall: No tenderness.   Neurological:      General: No focal deficit present.      Mental Status: He is alert " and oriented to person, place, and time.         ECG 12 Lead    Date/Time: 3/12/2025 12:38 PM  Performed by: Nisreen Jacome APRN    Authorized by: Nisreen Jacome APRN  Comparison: not compared with previous ECG   Rhythm: sinus bradycardia  Rate: normal  BPM: 51  Conduction: conduction normal  ST Segments: ST segments normal  QRS axis: normal  Other: no other findings    Clinical impression: abnormal EKG  Clinical impression comment: sinus bradycardia        Assessment/Plan:   Diagnoses and all orders for this visit:    1. Chest pain, unspecified type (Primary)  -     XR Chest PA & Lateral; Future  -     ECG 12 Lead    2. Sinus bradycardia    3. Generalized headaches    4. Pleurisy  -     predniSONE (DELTASONE) 10 MG (21) dose pack; Use as directed on package  Dispense: 21 tablet; Refill: 0       Sinus alma present on EKG  CXR to r.o pneumonia/pleurisy   If negative for pneumonia will treat for pleurisy with steroids.     *30 minutes spent with patient POC, education and medication management.       Follow Up:   Return if symptoms worsen or fail to improve.      Nisreen Jacome. SABRINA   [facility]

## 2025-04-14 RX ORDER — SODIUM, POTASSIUM,MAG SULFATES 17.5-3.13G
1 SOLUTION, RECONSTITUTED, ORAL ORAL TAKE AS DIRECTED
Qty: 354 ML | Refills: 0 | Status: SHIPPED | OUTPATIENT
Start: 2025-04-14

## 2025-04-25 ENCOUNTER — OUTSIDE FACILITY SERVICE (OUTPATIENT)
Dept: GASTROENTEROLOGY | Facility: CLINIC | Age: 45
End: 2025-04-25
Payer: COMMERCIAL

## 2025-04-25 PROCEDURE — 45385 COLONOSCOPY W/LESION REMOVAL: CPT | Performed by: INTERNAL MEDICINE

## 2025-05-01 ENCOUNTER — RESULTS FOLLOW-UP (OUTPATIENT)
Dept: GASTROENTEROLOGY | Facility: CLINIC | Age: 45
End: 2025-05-01
Payer: COMMERCIAL

## 2025-05-01 NOTE — LETTER
Alfredo Muñoz  [address] [place] [address]    May 5, 2025     Dear Mr. Muñoz:    Below are the results from your recent visit:    MrCresencio, you  had one low risk precancerous adenoma polyp and one completely benign hyperplastic polyp.  I recommend a repeat colonoscopy in 7 years rather than 5 years.      Sincerely,        Elizabeth Paula MD